# Patient Record
Sex: MALE | Race: WHITE | URBAN - NONMETROPOLITAN AREA
[De-identification: names, ages, dates, MRNs, and addresses within clinical notes are randomized per-mention and may not be internally consistent; named-entity substitution may affect disease eponyms.]

---

## 2022-07-15 ENCOUNTER — TELEPHONE (OUTPATIENT)
Dept: FAMILY MEDICINE CLINIC | Age: 67
End: 2022-07-15

## 2022-07-15 RX ORDER — TADALAFIL 20 MG/1
20 TABLET ORAL DAILY PRN
Qty: 60 TABLET | Refills: 0 | Status: SHIPPED | OUTPATIENT
Start: 2022-07-15

## 2022-12-12 NOTE — TELEPHONE ENCOUNTER
I called and talked with and he is planning on following with Dr Latasha Gayle. Pt refuses to make appt. Pt states he does not need to be seen. Pt states he will be out of Eliquis tomorrow.

## 2022-12-13 RX ORDER — APIXABAN 5 MG/1
TABLET, FILM COATED ORAL
Qty: 180 TABLET | Refills: 0 | Status: SHIPPED | OUTPATIENT
Start: 2022-12-13

## 2022-12-15 NOTE — TELEPHONE ENCOUNTER
Left message on answering machine requesting pt to call back at earliest convenience to make an appt.

## 2022-12-16 NOTE — TELEPHONE ENCOUNTER
The patient called back.  He scheduled an appointment to see Dr. Donnell De Dios in the office on 03- at 9:00 am.

## 2023-01-23 ENCOUNTER — TELEPHONE (OUTPATIENT)
Dept: FAMILY MEDICINE CLINIC | Age: 68
End: 2023-01-23

## 2023-01-23 NOTE — TELEPHONE ENCOUNTER
The patient called and wanted to know if you would send in medication for him? He has been fighting a cold for a month now. He still has a cough and his throat is raspy in the mornings. He said that he had a z danii and that helped but it seems like this is holding on. He would like the medication sent to [facility] in [place], [place].

## 2023-01-23 NOTE — TELEPHONE ENCOUNTER
I talked with patient and he has had sxs for approx 1month. Pt states he was with a friend who is an ER doctor and he prescribed the Zpack. Pt states that was approx 2 wks ago. Pt states he is approx 95% better but just not completely over sxs. Pt states he still has a slight cough, nasal congestion, and post nasal drainage. Pt denies any fever or sob. Pt c/o raspy voice in the morning. Pt has been taking Vitamin C with zinc, but no OTC cold or allergy medications. Pt feels that the Zpack helped some but did not clear it up all the way. Pt states the Nan Flake never clears things up for him. Pt is req rx for Amoxil. Pt states Dr Todd Medley prescribes yearly for him.

## 2023-01-24 RX ORDER — AMOXICILLIN 875 MG/1
875 TABLET, COATED ORAL 2 TIMES DAILY
Qty: 20 TABLET | Refills: 0 | Status: SHIPPED | OUTPATIENT
Start: 2023-01-24 | End: 2023-02-03

## 2023-01-24 NOTE — TELEPHONE ENCOUNTER
I called and spoke to the patient. I let him know that Dr. Sam Martines sent in amoxicillin for him to [address] [place] [address] in [place], [place]. He voiced understanding and wanted me to let Dr. Sam Martines know he said Hi and Thank you.

## 2023-03-09 ENCOUNTER — OFFICE VISIT (OUTPATIENT)
Dept: FAMILY MEDICINE CLINIC | Age: 68
End: 2023-03-09
Payer: MEDICARE

## 2023-03-09 VITALS
HEART RATE: 72 BPM | HEIGHT: 72 IN | DIASTOLIC BLOOD PRESSURE: 76 MMHG | OXYGEN SATURATION: 96 % | BODY MASS INDEX: 29.53 KG/M2 | SYSTOLIC BLOOD PRESSURE: 126 MMHG | WEIGHT: 218 LBS

## 2023-03-09 DIAGNOSIS — I10 PRIMARY HYPERTENSION: ICD-10-CM

## 2023-03-09 DIAGNOSIS — Z12.5 PROSTATE CANCER SCREENING: ICD-10-CM

## 2023-03-09 DIAGNOSIS — Z12.11 COLON CANCER SCREENING: ICD-10-CM

## 2023-03-09 DIAGNOSIS — I48.11 LONGSTANDING PERSISTENT ATRIAL FIBRILLATION (HCC): Primary | ICD-10-CM

## 2023-03-09 PROCEDURE — G8484 FLU IMMUNIZE NO ADMIN: HCPCS | Performed by: FAMILY MEDICINE

## 2023-03-09 PROCEDURE — 99214 OFFICE O/P EST MOD 30 MIN: CPT | Performed by: FAMILY MEDICINE

## 2023-03-09 PROCEDURE — 3078F DIAST BP <80 MM HG: CPT | Performed by: FAMILY MEDICINE

## 2023-03-09 PROCEDURE — G8417 CALC BMI ABV UP PARAM F/U: HCPCS | Performed by: FAMILY MEDICINE

## 2023-03-09 PROCEDURE — 1036F TOBACCO NON-USER: CPT | Performed by: FAMILY MEDICINE

## 2023-03-09 PROCEDURE — G8427 DOCREV CUR MEDS BY ELIG CLIN: HCPCS | Performed by: FAMILY MEDICINE

## 2023-03-09 PROCEDURE — 1123F ACP DISCUSS/DSCN MKR DOCD: CPT | Performed by: FAMILY MEDICINE

## 2023-03-09 PROCEDURE — 3074F SYST BP LT 130 MM HG: CPT | Performed by: FAMILY MEDICINE

## 2023-03-09 PROCEDURE — 3017F COLORECTAL CA SCREEN DOC REV: CPT | Performed by: FAMILY MEDICINE

## 2023-03-09 RX ORDER — LISINOPRIL 10 MG/1
10 TABLET ORAL DAILY
Qty: 90 TABLET | Refills: 3 | Status: SHIPPED | OUTPATIENT
Start: 2023-03-09 | End: 2023-06-07

## 2023-03-09 SDOH — ECONOMIC STABILITY: FOOD INSECURITY: WITHIN THE PAST 12 MONTHS, YOU WORRIED THAT YOUR FOOD WOULD RUN OUT BEFORE YOU GOT MONEY TO BUY MORE.: NEVER TRUE

## 2023-03-09 SDOH — ECONOMIC STABILITY: HOUSING INSECURITY
IN THE LAST 12 MONTHS, WAS THERE A TIME WHEN YOU DID NOT HAVE A STEADY PLACE TO SLEEP OR SLEPT IN A SHELTER (INCLUDING NOW)?: NO

## 2023-03-09 SDOH — ECONOMIC STABILITY: INCOME INSECURITY: HOW HARD IS IT FOR YOU TO PAY FOR THE VERY BASICS LIKE FOOD, HOUSING, MEDICAL CARE, AND HEATING?: NOT HARD AT ALL

## 2023-03-09 SDOH — ECONOMIC STABILITY: FOOD INSECURITY: WITHIN THE PAST 12 MONTHS, THE FOOD YOU BOUGHT JUST DIDN'T LAST AND YOU DIDN'T HAVE MONEY TO GET MORE.: NEVER TRUE

## 2023-03-09 ASSESSMENT — ENCOUNTER SYMPTOMS
TROUBLE SWALLOWING: 0
CONSTIPATION: 0
ABDOMINAL PAIN: 0
COUGH: 0
VOMITING: 0
SORE THROAT: 0
SHORTNESS OF BREATH: 0
DIARRHEA: 0

## 2023-03-09 ASSESSMENT — PATIENT HEALTH QUESTIONNAIRE - PHQ9
1. LITTLE INTEREST OR PLEASURE IN DOING THINGS: 0
SUM OF ALL RESPONSES TO PHQ9 QUESTIONS 1 & 2: 0
2. FEELING DOWN, DEPRESSED OR HOPELESS: 0
SUM OF ALL RESPONSES TO PHQ QUESTIONS 1-9: 0

## 2023-03-09 NOTE — PROGRESS NOTES
[address] [id number]  Dept: [phone]     SKYLAR Cohn is a 79 y.o.male    Pt presents for follow up medications. Pt feeling ok since last visit- interval history and any new issues noted below:       Patient Active Problem List   Diagnosis    Thoracic compression fracture (HCC)       Current Outpatient Medications   Medication Sig Dispense Refill    apixaban (ELIQUIS) 5 MG TABS tablet Take 1 tablet by mouth 2 times daily 180 tablet 3    metoprolol tartrate (LOPRESSOR) 25 MG tablet Take 0.5 tablets by mouth in the morning and at bedtime 90 tablet 3    lisinopril (PRINIVIL;ZESTRIL) 10 MG tablet Take 1 tablet by mouth daily 90 tablet 3    tadalafil (CIALIS) 20 MG tablet Take 1 tablet by mouth daily as needed for Erectile Dysfunction 60 tablet 0    acetaminophen 650 MG TABS Take 650 mg by mouth every 6 hours. No current facility-administered medications for this visit. Review of Systems   Constitutional:  Negative for appetite change, fatigue, fever and unexpected weight change. HENT:  Negative for congestion, ear pain, sore throat and trouble swallowing. Eyes:  Negative for visual disturbance. Respiratory:  Negative for cough and shortness of breath. Cardiovascular:  Negative for chest pain, palpitations and leg swelling. Gastrointestinal:  Negative for abdominal pain, constipation, diarrhea and vomiting. Genitourinary:  Negative for dysuria and frequency. Musculoskeletal:  Negative for arthralgias and myalgias. Skin:  Negative for rash. Neurological:  Negative for dizziness. OBJECTIVE     /76 (Site: Left Upper Arm, Position: Sitting)   Pulse 72   Ht 6' (1.829 m)   Wt 218 lb (98.9 kg)   SpO2 96%   BMI 29.57 kg/m²   Body mass index is 29.57 kg/m². BP Readings from Last 3 Encounters:   03/09/23 126/76         Physical Exam  Vitals and nursing note reviewed.    Constitutional: General: He is not in acute distress. Appearance: Normal appearance. He is normal weight. He is not ill-appearing. HENT:      Head: Normocephalic and atraumatic. Right Ear: Tympanic membrane, ear canal and external ear normal.      Left Ear: Tympanic membrane, ear canal and external ear normal.      Nose: Nose normal.      Mouth/Throat:      Mouth: Mucous membranes are moist.      Pharynx: Oropharynx is clear. Eyes:      Extraocular Movements: Extraocular movements intact. Conjunctiva/sclera: Conjunctivae normal.      Pupils: Pupils are equal, round, and reactive to light. Cardiovascular:      Rate and Rhythm: Normal rate and regular rhythm. Pulses: Normal pulses. Heart sounds: No murmur heard. Pulmonary:      Effort: Pulmonary effort is normal. No respiratory distress. Breath sounds: Normal breath sounds. No wheezing. Abdominal:      General: Abdomen is flat. Bowel sounds are normal. There is no distension. Palpations: Abdomen is soft. There is no mass. Tenderness: There is no abdominal tenderness. There is no guarding or rebound. Musculoskeletal:         General: No swelling. Normal range of motion. Cervical back: Normal range of motion. No tenderness. Right lower leg: No edema. Left lower leg: No edema. Lymphadenopathy:      Cervical: No cervical adenopathy. Skin:     General: Skin is warm. Findings: No rash. Neurological:      General: No focal deficit present. Mental Status: He is alert. Psychiatric:         Mood and Affect: Mood normal.       No results found for this visit on 03/09/23.     No results found for: LABA1C    No results found for: CHOL, TRIG, HDL, LDLCALC, LDLDIRECT    The ASCVD Risk score (Chen DK, et al., 2019) failed to calculate for the following reasons:    Cannot find a previous HDL lab    Cannot find a previous total cholesterol lab    Lab Results   Component Value Date     11/14/2014    K 4.4 11/14/2014     11/14/2014    CO2 30 11/14/2014    BUN 12 11/14/2014    CREATININE 1.1 11/14/2014    GLUCOSE 136 (H) 11/14/2014     CrCl cannot be calculated (Patient's most recent lab result is older than the maximum 180 days allowed. ). No results found for: Ceasar Julián    No results found for: TSH, Q5HGTHB, N2HSPYM, THYROIDAB, FT3, T4FREE    Lab Results   Component Value Date    WBC 12.6 (H) 11/14/2014    HGB 14.8 11/14/2014    HCT 45.4 11/14/2014    MCV 91.8 11/14/2014     11/14/2014       No results found for: PSA, PSADIA      There is no immunization history on file for this patient. Health Maintenance   Topic Date Due    COVID-19 Vaccine (1) Never done    Depression Screen  Never done    Hepatitis C screen  Never done    DTaP/Tdap/Td vaccine (1 - Tdap) Never done    Diabetes screen  Never done    Lipids  Never done    Colorectal Cancer Screen  Never done    Shingles vaccine (1 of 2) Never done    Pneumococcal 65+ years Vaccine (1 - PCV) Never done    Flu vaccine (1) Never done    Hepatitis A vaccine  Aged Out    Hib vaccine  Aged Out    Meningococcal (ACWY) vaccine  Aged Out       Future Appointments   Date Time Provider Jeremias Montoya   3/7/2024  8:00 AM Acosta Archibald MD [address] [facility] [place]         ASSESSMENT       Diagnosis Orders   1. Longstanding persistent atrial fibrillation (HCC)  apixaban (ELIQUIS) 5 MG TABS tablet    metoprolol tartrate (LOPRESSOR) 25 MG tablet    CBC with Auto Differential    Comprehensive Metabolic Panel, Fasting    Lipid, Fasting    TSH with Reflex      2. Primary hypertension  metoprolol tartrate (LOPRESSOR) 25 MG tablet    lisinopril (PRINIVIL;ZESTRIL) 10 MG tablet    CBC with Auto Differential    Comprehensive Metabolic Panel, Fasting    Lipid, Fasting    TSH with Reflex      3. Colon cancer screening  Fecal DNA Colorectal cancer screening (Cologuard)      4. Prostate cancer screening  PSA Screening          PLAN      1.  Longstanding persistent atrial fibrillation [facility])  Patient currently in sinus rhythm. Follows with cardiology once a year at [facility] clinic. Status post ablation. Continue current treatment as below meds renewed labs ordered  - apixaban (ELIQUIS) 5 MG TABS tablet; Take 1 tablet by mouth 2 times daily  Dispense: 180 tablet; Refill: 3  - metoprolol tartrate (LOPRESSOR) 25 MG tablet; Take 0.5 tablets by mouth in the morning and at bedtime  Dispense: 90 tablet; Refill: 3  - CBC with Auto Differential; Future  - Comprehensive Metabolic Panel, Fasting; Future  - Lipid, Fasting; Future  - TSH with Reflex; Future    2. Primary hypertension  Excellent control no change meds renewed as below Labs ordered  - metoprolol tartrate (LOPRESSOR) 25 MG tablet; Take 0.5 tablets by mouth in the morning and at bedtime  Dispense: 90 tablet; Refill: 3  - lisinopril (PRINIVIL;ZESTRIL) 10 MG tablet; Take 1 tablet by mouth daily  Dispense: 90 tablet; Refill: 3  - CBC with Auto Differential; Future  - Comprehensive Metabolic Panel, Fasting; Future  - Lipid, Fasting; Future  - TSH with Reflex; Future    3. Colon cancer screening  Patient refuses colonoscopy but agreeable to Cologuard ordered  - Fecal DNA Colorectal cancer screening (Cologuard)    4. Prostate cancer screening  PSA ordered  - PSA Screening; Future       Follow-up 1 year  Preventive Health Topics:  Pt declines HIV and HEP C screening at this time due to lack of risk factors. Encouraged COVID VACCINE  Encouraged FLU VACCINE after October 1st annually. Encouraged TETANUS SHOT (TdaP/ ADACEL/ [address]) per personal pharmacy. Encouraged PREVNAR 20 at this time  Encouraged SHINGLES SHOT [facility]) - check with insurance re coverage and location of administration.         Marga Su MD  1:10 PM  3/9/23

## 2023-03-29 LAB — NONINV COLON CA DNA+OCC BLD SCRN STL QL: NEGATIVE

## 2023-06-22 ENCOUNTER — TELEPHONE (OUTPATIENT)
Dept: FAMILY MEDICINE CLINIC | Age: 68
End: 2023-06-22

## 2023-06-22 ENCOUNTER — HOSPITAL ENCOUNTER (OUTPATIENT)
Age: 68
Discharge: HOME | End: 2023-06-22
Payer: MEDICARE

## 2023-06-22 DIAGNOSIS — R31.0 GROSS HEMATURIA: Primary | ICD-10-CM

## 2023-06-22 DIAGNOSIS — R31.0 GROSS HEMATURIA: ICD-10-CM

## 2023-06-22 PROCEDURE — 87086 URINE CULTURE/COLONY COUNT: CPT

## 2023-06-22 PROCEDURE — 81001 URINALYSIS AUTO W/SCOPE: CPT

## 2023-06-22 NOTE — TELEPHONE ENCOUNTER
Being able to see blood in urine is not normal.  It could mean infection or kidney stone or bladder issue that needs identified. First step is urine analysis and culture. We can order that and schedule visit for next week to review results and discuss next steps.

## 2023-06-22 NOTE — TELEPHONE ENCOUNTER
I called the patient back to let him know what Dr. Javier Hogan advises in regards to the blood in the urine he had this morning. No answer left VM requesting the patient to call the office back.

## 2023-06-22 NOTE — TELEPHONE ENCOUNTER
Pt called back and was educated on Dr. Luba Leal's suggestions. Was trying to be accommodating to pt since lives in [place] and made him aware that he had the option of getting urine sample done at [facility] outpatient lab. Pt proceeded to state \"I absolutely refuse to go there, it's not any better than a vet's office\". Pt also stated he would just collect a urine sample in a jar from home to be sent to [address] [place] [address] and tested. Pt was educated that urine sample for culture needs to be a \"clean catch\" in a proper urine collection container. Pt laughed and proceeded to say that he refuses to do things that are going to \"rack up bills\". Continued to educate pt that we are just looking out for his needs and trying to care for/help him due to the concerning sx he was having. Pt later agreed he would go to [facility] outpatient lab to have urine specimen collected for orders.

## 2023-06-22 NOTE — TELEPHONE ENCOUNTER
Patient woke this morning and had a small amount of blood in his urine, states he has had no pain, no burning and hasn't had any other symptoms. Patient states he does not feel it's an urgent matter and does not want to come in today if he doesn't have to but wanted to know what he should do?

## 2023-06-23 LAB
BACTERIA: ABNORMAL
BILIRUB UR QL STRIP: NEGATIVE
CASTS #/AREA URNS LPF: ABNORMAL /LPF
CASTS #/AREA URNS LPF: ABNORMAL /LPF
CHARACTER UR: CLEAR
CHARCOAL URNS QL MICRO: ABNORMAL
COLOR UR: YELLOW
CRYSTALS URNS QL MICRO: ABNORMAL
EPITHELIAL CELLS, UA: ABNORMAL /HPF
GLUCOSE UR QL STRIP.AUTO: NEGATIVE MG/DL
HGB UR QL STRIP.AUTO: ABNORMAL
KETONES UR QL STRIP.AUTO: NEGATIVE
LEUKOCYTE ESTERASE UR QL STRIP.AUTO: ABNORMAL
NITRITE UR QL STRIP.AUTO: NEGATIVE
PH UR STRIP.AUTO: 5.5 [PH] (ref 5–9)
PROT UR STRIP.AUTO-MCNC: NEGATIVE MG/DL
RBC #/AREA URNS HPF: ABNORMAL /HPF
RENAL EPI CELLS #/AREA URNS HPF: ABNORMAL /[HPF]
SP GR UR REFRACT.AUTO: 1 (ref 1–1.03)
UROBILINOGEN UR QL STRIP.AUTO: 0.2 EU/DL (ref 0–1)
WBC #/AREA URNS HPF: ABNORMAL /HPF
YEAST LIKE FUNGI URNS QL MICRO: ABNORMAL

## 2023-06-24 LAB — BACTERIA UR CULT: NORMAL

## 2023-07-11 ENCOUNTER — TELEPHONE (OUTPATIENT)
Dept: UROLOGY | Age: 68
End: 2023-07-11

## 2023-07-11 ENCOUNTER — OFFICE VISIT (OUTPATIENT)
Dept: UROLOGY | Age: 68
End: 2023-07-11
Payer: MEDICARE

## 2023-07-11 VITALS — RESPIRATION RATE: 16 BRPM | WEIGHT: 218 LBS | HEIGHT: 72 IN | BODY MASS INDEX: 29.53 KG/M2

## 2023-07-11 DIAGNOSIS — N52.9 ERECTILE DYSFUNCTION, UNSPECIFIED ERECTILE DYSFUNCTION TYPE: ICD-10-CM

## 2023-07-11 DIAGNOSIS — R31.0 GROSS HEMATURIA: Primary | ICD-10-CM

## 2023-07-11 PROCEDURE — 1123F ACP DISCUSS/DSCN MKR DOCD: CPT | Performed by: UROLOGY

## 2023-07-11 PROCEDURE — G8417 CALC BMI ABV UP PARAM F/U: HCPCS | Performed by: UROLOGY

## 2023-07-11 PROCEDURE — 3017F COLORECTAL CA SCREEN DOC REV: CPT | Performed by: UROLOGY

## 2023-07-11 PROCEDURE — 99204 OFFICE O/P NEW MOD 45 MIN: CPT | Performed by: UROLOGY

## 2023-07-11 PROCEDURE — 1036F TOBACCO NON-USER: CPT | Performed by: UROLOGY

## 2023-07-11 PROCEDURE — G8427 DOCREV CUR MEDS BY ELIG CLIN: HCPCS | Performed by: UROLOGY

## 2023-07-11 NOTE — TELEPHONE ENCOUNTER
Patient scheduled for CT UROGRAM  at [address] [place] [address] on 8/8/23 ARRIVAL OF 7AM FOR A 730AM. NPO 4 HOURS PRIOR TO SCAN.     Order  with instructions given to the patient in the office

## 2023-08-08 ENCOUNTER — HOSPITAL ENCOUNTER (OUTPATIENT)
Dept: CT IMAGING | Age: 68
Discharge: HOME | End: 2023-08-08
Attending: UROLOGY
Payer: MEDICARE

## 2023-08-08 ENCOUNTER — HOSPITAL ENCOUNTER (OUTPATIENT)
Age: 68
Discharge: HOME | End: 2023-08-08
Attending: UROLOGY
Payer: MEDICARE

## 2023-08-08 DIAGNOSIS — R31.0 GROSS HEMATURIA: ICD-10-CM

## 2023-08-08 DIAGNOSIS — N52.9 ERECTILE DYSFUNCTION, UNSPECIFIED ERECTILE DYSFUNCTION TYPE: ICD-10-CM

## 2023-08-08 LAB
CREAT BLD-MCNC: 0.9 MG/DL (ref 0.5–1.2)
GFR SERPL CREATININE-BSD FRML MDRD: > 60 ML/MIN/1.73M2
PSA SERPL-MCNC: 3.43 NG/ML (ref 0–1)

## 2023-08-08 PROCEDURE — 82565 ASSAY OF CREATININE: CPT

## 2023-08-08 PROCEDURE — 74178 CT ABD&PLV WO CNTR FLWD CNTR: CPT | Performed by: UROLOGY

## 2023-08-08 PROCEDURE — 84153 ASSAY OF PSA TOTAL: CPT

## 2023-08-08 PROCEDURE — 6360000004 HC RX CONTRAST MEDICATION: Performed by: UROLOGY

## 2023-08-08 RX ADMIN — IOPAMIDOL 100 ML: 755 INJECTION, SOLUTION INTRAVENOUS at 07:46

## 2023-08-15 ENCOUNTER — PROCEDURE VISIT (OUTPATIENT)
Dept: UROLOGY | Age: 68
End: 2023-08-15
Payer: MEDICARE

## 2023-08-15 VITALS — RESPIRATION RATE: 18 BRPM | HEIGHT: 72 IN | BODY MASS INDEX: 29.53 KG/M2 | WEIGHT: 218 LBS

## 2023-08-15 DIAGNOSIS — R31.0 GROSS HEMATURIA: Primary | ICD-10-CM

## 2023-08-15 DIAGNOSIS — N52.9 ERECTILE DYSFUNCTION, UNSPECIFIED ERECTILE DYSFUNCTION TYPE: ICD-10-CM

## 2023-08-15 PROCEDURE — 52000 CYSTOURETHROSCOPY: CPT | Performed by: UROLOGY

## 2023-08-15 RX ORDER — FINASTERIDE 5 MG/1
5 TABLET, FILM COATED ORAL DAILY
Qty: 90 TABLET | Refills: 3 | Status: SHIPPED | OUTPATIENT
Start: 2023-08-15 | End: 2023-11-13

## 2023-08-15 NOTE — PROGRESS NOTES
Cystoscopy    Operative Note    Patient:  Cheo Nicole  MRN: [id number]  YOB: 1955    Date: 08/15/23  Surgeon: Rhiannon Garcia MD  Anesthesia: Erinn Solian Local  Indications:     imaging independently reviewed by Rhiannon Garcia MD and radiology report verified demonstrating     CT UROGRAM    Result Date: 8/8/2023  PROCEDURE: CT UROGRAM CLINICAL INFORMATION: Gross hematuria. COMPARISON: No prior study. TECHNIQUE: 5 mm axial imaging through the abdomen and pelvis without contrast, 5 mm axial imaging through the abdomen with intravenous contrast (2 minute delay) and 5 mm axial imaging through the abdomen and pelvis with intravenous contrast (8 minute delay). Coronal and sagittal reconstructions of the abdomen and pelvis (8 minute delay) were also performed. All CT scans at this facility use dose modulation, iterative reconstruction, and/or weight based dosing when appropriate to reduce the radiation dose to as low as reasonably achievable. CONTRAST: 100 cc Isovue-370. FINDINGS: Lung bases: Dependent atelectasis/scarring is noted at the lung bases. A benign calcified granuloma is present at the left lung base. Liver/gallbladder/bilary tree: No radiopaque gallstones or biliary ductal dilatation is identified. Hepatic steatosis is observed. No liver lesions are present. Pancreas: Normal. Spleen : Normal. Adrenal glands: Normal. Kidneys/ ureters/ bladder: A 15 mm nonenhancing simple cyst in the midpole of the right kidney contains peripheral 3 mm calcification (series 3, image 26). No hydronephrosis or hydroureter is present. The urinary bladder is partially distended and grossly unremarkable. Gastrointestinal:  Colonic diverticulosis without diverticulitis is visualized. Moderate retained fecal material is seen throughout the colon. No bowel obstruction, free fluid, collection, or free air is observed. The appendix is normal. A small fat-containing right inguinal hernia is present.  A small sliding-type

## 2023-12-21 ENCOUNTER — TELEPHONE (OUTPATIENT)
Dept: FAMILY MEDICINE CLINIC | Age: 68
End: 2023-12-21

## 2023-12-21 RX ORDER — TADALAFIL 20 MG/1
20 TABLET ORAL DAILY PRN
Qty: 90 TABLET | Refills: 0 | Status: SHIPPED | OUTPATIENT
Start: 2023-12-21

## 2023-12-21 NOTE — TELEPHONE ENCOUNTER
Pt called stating that he needs RF on his Cialis 20mg.  Has upcoming appt with you on 02/29/2024, previous visit was 03/09/2023.  Pt would like rx sent to [facility], please.

## 2024-01-12 ENCOUNTER — OFFICE VISIT (OUTPATIENT)
Dept: FAMILY MEDICINE CLINIC | Age: 69
End: 2024-01-12
Payer: MEDICARE

## 2024-01-12 VITALS
SYSTOLIC BLOOD PRESSURE: 126 MMHG | DIASTOLIC BLOOD PRESSURE: 78 MMHG | BODY MASS INDEX: 29.46 KG/M2 | RESPIRATION RATE: 16 BRPM | HEART RATE: 69 BPM | OXYGEN SATURATION: 96 % | WEIGHT: 217.5 LBS | TEMPERATURE: 98.4 F | HEIGHT: 72 IN

## 2024-01-12 DIAGNOSIS — J20.9 ACUTE BRONCHITIS, UNSPECIFIED ORGANISM: Primary | ICD-10-CM

## 2024-01-12 DIAGNOSIS — R05.3 CHRONIC COUGH: ICD-10-CM

## 2024-01-12 DIAGNOSIS — Z91.09 ENVIRONMENTAL ALLERGIES: ICD-10-CM

## 2024-01-12 PROCEDURE — G8427 DOCREV CUR MEDS BY ELIG CLIN: HCPCS

## 2024-01-12 PROCEDURE — 1123F ACP DISCUSS/DSCN MKR DOCD: CPT

## 2024-01-12 PROCEDURE — 1036F TOBACCO NON-USER: CPT

## 2024-01-12 PROCEDURE — G8417 CALC BMI ABV UP PARAM F/U: HCPCS

## 2024-01-12 PROCEDURE — G8484 FLU IMMUNIZE NO ADMIN: HCPCS

## 2024-01-12 PROCEDURE — 99213 OFFICE O/P EST LOW 20 MIN: CPT

## 2024-01-12 PROCEDURE — 3017F COLORECTAL CA SCREEN DOC REV: CPT

## 2024-01-12 RX ORDER — METHYLPREDNISOLONE 4 MG/1
TABLET ORAL
Qty: 1 KIT | Refills: 0 | Status: SHIPPED | OUTPATIENT
Start: 2024-01-12 | End: 2024-01-18

## 2024-01-12 ASSESSMENT — PATIENT HEALTH QUESTIONNAIRE - PHQ9: DEPRESSION UNABLE TO ASSESS: PT REFUSES

## 2024-01-12 NOTE — PROGRESS NOTES
[facility]  [address]   [place] [address]  Dept: [phone]  Loc: [phone]     Jaren Souza (:  1955) is a 68 y.o. male, here for evaluation of the following chief complaint(s):  Cough (Coughing up yellow mucous. He has a cough that is always there. Never goes away)      ASSESSMENT/PLAN:  1. Acute bronchitis, unspecified organism  -     methylPREDNISolone (MEDROL, ANJALI,) 4 MG tablet; Take with food., Disp-1 kit, R-0Normal  2. Chronic cough  3. Environmental allergies      Acute bronchitis, likely viral. Refused Covid/flu testing.   Recommended imaging such as chest xray to evaluate chronic cough. He declines at this time.  I did recommend daily Zyrtec.   Medrol pack sent in.  Continue with OTC cough medicine.   Push fluids.  If cough persists follow up.     Discussed use, benefit, and side effects of prescribed medications.  Barriers to medication compliance addressed.  All patient questions answered.  Pt voiced understanding.     Return for As needed or if symptoms don't improve. Has f/u on chronic conditions with Dr. Leal scheduled.     SUBJECTIVE/OBJECTIVE:  TASHA Eastman is here today for concerns of ongoing cough over last year. He develops occasional nasal drainage, pnd and cough throughout year. States is worse during spring/fall seasons. Dust also seems to make it worse. Cough is typically productive of clear sputum. Over last few weeks his symptoms have worsened. He was on recent trip from end of December up to this week. He and some friends went riding their motorcycles down to [place]. On  he started with nasal congestion, worsening cough, body aches, and fever. His two friends had symptoms around the same time and they both tested for Covid. Jaren went to urgent care on  and refused covid/flu testing. He states he does not believe in Covid and that the test are probably not accurate. He

## 2024-01-15 ASSESSMENT — ENCOUNTER SYMPTOMS
COUGH: 1
WHEEZING: 0
SHORTNESS OF BREATH: 0
ABDOMINAL PAIN: 0
SINUS PAIN: 0
SINUS PRESSURE: 0
RHINORRHEA: 1
CONSTIPATION: 0
SORE THROAT: 0
CHEST TIGHTNESS: 1
DIARRHEA: 0
NAUSEA: 0

## 2024-02-21 LAB — PROSTATE SPECIFIC ANTIGEN: 1.19 NG/ML

## 2024-02-21 NOTE — PROGRESS NOTES
[facility]  [facility]  [address]  [facility] [address]  Dept: [phone]  Loc: [phone]    Visit Date: 2/22/2024        HPI:     HPI  Mr. Souza is a 68-year-old male that presents in follow-up.    Hx of Gross hematuria.  He subsequently had a CT urogram on August 8, 2023.  Notable for a 15 mm nonenhancing simple cyst in the midpole of the right kidney which contains a 3 mm peripheral calcification.  The prostate gland is enlarged with mass effect on the posterior wall of the urinary bladder as well as intravesicular component of the prostate gland.  Cystoscopy performed on August 15, 2023 with Dr. Horowitz.  Patient with a hypervascular prostate.  This is likely the source of bleeding.  Finasteride started. Patient otherwise denies hx of smoking.     PSA in August 2023 at 3.43.  Patient does have a enlarged prostate.    Also taking Cialis prn for erectile dysfunction.Dr. Leopold is the current prescriber. Does not currently have script for imdur or nitroglycerin.    He does take Eliquis.     Current Outpatient Medications   Medication Sig Dispense Refill    finasteride (PROSCAR) 5 MG tablet Take 1 tablet by mouth daily 90 tablet 3    tadalafil (CIALIS) 20 MG tablet Take 1 tablet by mouth daily as needed for Erectile Dysfunction 90 tablet 0    metoprolol tartrate (LOPRESSOR) 25 MG tablet Take 0.5 tablets by mouth in the morning and at bedtime 90 tablet 3    acetaminophen 650 MG TABS Take 650 mg by mouth every 6 hours.      apixaban (ELIQUIS) 5 MG TABS tablet Take 1 tablet by mouth 2 times daily 180 tablet 3    lisinopril (PRINIVIL;ZESTRIL) 10 MG tablet Take 1 tablet by mouth daily 90 tablet 3     No current facility-administered medications for this visit.       Past Medical History  Jaren  has a past medical history of Hypertension.    Past Surgical History  The patient  has no past surgical history on file.    Family History  This patient's family

## 2024-02-22 ENCOUNTER — OFFICE VISIT (OUTPATIENT)
Dept: UROLOGY | Age: 69
End: 2024-02-22
Payer: MEDICARE

## 2024-02-22 VITALS — RESPIRATION RATE: 16 BRPM | BODY MASS INDEX: 29.39 KG/M2 | HEIGHT: 72 IN | WEIGHT: 217 LBS

## 2024-02-22 DIAGNOSIS — N40.0 PROSTATE ENLARGEMENT: ICD-10-CM

## 2024-02-22 DIAGNOSIS — R31.0 GROSS HEMATURIA: Primary | ICD-10-CM

## 2024-02-22 LAB
BILIRUBIN, POC: NORMAL
BLOOD URINE, POC: NORMAL
CLARITY, POC: CLEAR
COLOR, POC: YELLOW
GLUCOSE URINE, POC: NORMAL
KETONES, POC: NORMAL
LEUKOCYTE EST, POC: NORMAL
NITRITE, POC: NORMAL
PH, POC: 7.5
PROTEIN, POC: NORMAL
SPECIFIC GRAVITY, POC: 1.01
UROBILINOGEN, POC: 0.2

## 2024-02-22 PROCEDURE — 1036F TOBACCO NON-USER: CPT

## 2024-02-22 PROCEDURE — 81003 URINALYSIS AUTO W/O SCOPE: CPT

## 2024-02-22 PROCEDURE — G8484 FLU IMMUNIZE NO ADMIN: HCPCS

## 2024-02-22 PROCEDURE — 3017F COLORECTAL CA SCREEN DOC REV: CPT

## 2024-02-22 PROCEDURE — G8427 DOCREV CUR MEDS BY ELIG CLIN: HCPCS

## 2024-02-22 PROCEDURE — 99214 OFFICE O/P EST MOD 30 MIN: CPT

## 2024-02-22 PROCEDURE — 1123F ACP DISCUSS/DSCN MKR DOCD: CPT

## 2024-02-22 PROCEDURE — G8417 CALC BMI ABV UP PARAM F/U: HCPCS

## 2024-02-22 RX ORDER — FINASTERIDE 5 MG/1
5 TABLET, FILM COATED ORAL DAILY
Qty: 90 TABLET | Refills: 3 | Status: SHIPPED | OUTPATIENT
Start: 2024-02-22 | End: 2025-02-16

## 2024-02-22 NOTE — PATIENT INSTRUCTIONS
Follow-up in 1 year with a repeat PSA  Call with questions, comments, or concerns. I recommend going to the ED for further evaluation if you develop fever, chills, nausea, vomiting, chest pain, SOB, or calf pain.

## 2024-03-22 DIAGNOSIS — I48.11 LONGSTANDING PERSISTENT ATRIAL FIBRILLATION (HCC): ICD-10-CM

## 2024-03-22 RX ORDER — APIXABAN 5 MG/1
5 TABLET, FILM COATED ORAL 2 TIMES DAILY
Qty: 180 TABLET | Refills: 3 | Status: SHIPPED | OUTPATIENT
Start: 2024-03-22

## 2024-04-04 ENCOUNTER — OFFICE VISIT (OUTPATIENT)
Dept: FAMILY MEDICINE CLINIC | Age: 69
End: 2024-04-04

## 2024-04-04 VITALS
OXYGEN SATURATION: 96 % | DIASTOLIC BLOOD PRESSURE: 62 MMHG | HEART RATE: 64 BPM | HEIGHT: 72 IN | WEIGHT: 222.2 LBS | SYSTOLIC BLOOD PRESSURE: 118 MMHG | BODY MASS INDEX: 30.1 KG/M2

## 2024-04-04 DIAGNOSIS — I10 PRIMARY HYPERTENSION: ICD-10-CM

## 2024-04-04 DIAGNOSIS — Z00.00 INITIAL MEDICARE ANNUAL WELLNESS VISIT: ICD-10-CM

## 2024-04-04 DIAGNOSIS — Z00.00 MEDICARE ANNUAL WELLNESS VISIT, SUBSEQUENT: Primary | ICD-10-CM

## 2024-04-04 DIAGNOSIS — I48.11 LONGSTANDING PERSISTENT ATRIAL FIBRILLATION (HCC): ICD-10-CM

## 2024-04-04 RX ORDER — LISINOPRIL 10 MG/1
10 TABLET ORAL DAILY
Qty: 90 TABLET | Refills: 3 | Status: SHIPPED | OUTPATIENT
Start: 2024-04-04 | End: 2025-04-04

## 2024-04-04 SDOH — ECONOMIC STABILITY: FOOD INSECURITY: WITHIN THE PAST 12 MONTHS, THE FOOD YOU BOUGHT JUST DIDN'T LAST AND YOU DIDN'T HAVE MONEY TO GET MORE.: NEVER TRUE

## 2024-04-04 SDOH — ECONOMIC STABILITY: FOOD INSECURITY: WITHIN THE PAST 12 MONTHS, YOU WORRIED THAT YOUR FOOD WOULD RUN OUT BEFORE YOU GOT MONEY TO BUY MORE.: NEVER TRUE

## 2024-04-04 SDOH — ECONOMIC STABILITY: INCOME INSECURITY: HOW HARD IS IT FOR YOU TO PAY FOR THE VERY BASICS LIKE FOOD, HOUSING, MEDICAL CARE, AND HEATING?: NOT HARD AT ALL

## 2024-04-04 ASSESSMENT — PATIENT HEALTH QUESTIONNAIRE - PHQ9
SUM OF ALL RESPONSES TO PHQ9 QUESTIONS 1 & 2: 0
SUM OF ALL RESPONSES TO PHQ QUESTIONS 1-9: 0
1. LITTLE INTEREST OR PLEASURE IN DOING THINGS: NOT AT ALL
2. FEELING DOWN, DEPRESSED OR HOPELESS: NOT AT ALL
SUM OF ALL RESPONSES TO PHQ QUESTIONS 1-9: 0

## 2024-04-04 ASSESSMENT — ANXIETY QUESTIONNAIRES
6. BECOMING EASILY ANNOYED OR IRRITABLE: NOT AT ALL
IF YOU CHECKED OFF ANY PROBLEMS ON THIS QUESTIONNAIRE, HOW DIFFICULT HAVE THESE PROBLEMS MADE IT FOR YOU TO DO YOUR WORK, TAKE CARE OF THINGS AT HOME, OR GET ALONG WITH OTHER PEOPLE: NOT DIFFICULT AT ALL
GAD7 TOTAL SCORE: 0
5. BEING SO RESTLESS THAT IT IS HARD TO SIT STILL: NOT AT ALL
4. TROUBLE RELAXING: NOT AT ALL
2. NOT BEING ABLE TO STOP OR CONTROL WORRYING: NOT AT ALL
1. FEELING NERVOUS, ANXIOUS, OR ON EDGE: NOT AT ALL
3. WORRYING TOO MUCH ABOUT DIFFERENT THINGS: NOT AT ALL
7. FEELING AFRAID AS IF SOMETHING AWFUL MIGHT HAPPEN: NOT AT ALL

## 2024-04-04 ASSESSMENT — LIFESTYLE VARIABLES
HOW OFTEN DO YOU HAVE A DRINK CONTAINING ALCOHOL: NEVER
HOW MANY STANDARD DRINKS CONTAINING ALCOHOL DO YOU HAVE ON A TYPICAL DAY: PATIENT DOES NOT DRINK

## 2024-04-04 ASSESSMENT — ENCOUNTER SYMPTOMS: COLOR CHANGE: 1

## 2024-04-04 NOTE — PROGRESS NOTES
orders and patient instructions/AVS.  Recommended screening schedule for the next 5-10 years is provided to the patient in written form: see Patient Instructions/AVS.     Return in about 6 months (around 10/4/2024).     Subjective       Patient's complete Health Risk Assessment and screening values have been reviewed and are found in Flowsheets. The following problems were reviewed today and where indicated follow up appointments were made and/or referrals ordered.    Positive Risk Factor Screenings with Interventions:                Activity, Diet, and Weight:  On average, how many days per week do you engage in moderate to strenuous exercise (like a brisk walk)?: 6 days  On average, how many minutes do you engage in exercise at this level?: 30 min    Do you eat balanced/healthy meals regularly?: Yes    Body mass index is 30.14 kg/m². (!) Abnormal    Obesity Interventions:  Patient advised to follow-up in this office for further evaluation and treatment            Dentist Screen:  Have you seen the dentist within the past year?: (!) No    Intervention:  Advised to schedule with their dentist    Hearing Screen:  Do you or your family notice any trouble with your hearing that hasn't been managed with hearing aids?: (!) Yes    Interventions:  Patient declines any further evaluation or treatment    Vision Screen:  Do you have difficulty driving, watching TV, or doing any of your daily activities because of your eyesight?: No  Have you had an eye exam within the past year?: (!) No  No results found.    Interventions:   Patient encouraged to make appointment with their eye specialist      Advanced Directives:  Do you have a Living Will?: (!) No    Intervention:  has NO advanced directive - information provided                 Objective   Vitals:    04/04/24 0820   BP: 118/62   Site: Left Upper Arm   Position: Sitting   Cuff Size: Medium Adult   Pulse: 64   SpO2: 96%   Weight: 100.8 kg (222 lb 3.2 oz)   Height: 1.829 m

## 2024-04-09 ENCOUNTER — TELEPHONE (OUTPATIENT)
Dept: FAMILY MEDICINE CLINIC | Age: 69
End: 2024-04-09

## 2024-04-09 ENCOUNTER — HOSPITAL ENCOUNTER (OUTPATIENT)
Dept: GENERAL RADIOLOGY | Age: 69
Discharge: HOME | End: 2024-04-09
Payer: MEDICARE

## 2024-04-09 ENCOUNTER — HOSPITAL ENCOUNTER (OUTPATIENT)
Age: 69
Discharge: HOME | End: 2024-04-09
Payer: MEDICARE

## 2024-04-09 DIAGNOSIS — M25.532 LEFT WRIST PAIN: Primary | ICD-10-CM

## 2024-04-09 DIAGNOSIS — M25.532 LEFT WRIST PAIN: ICD-10-CM

## 2024-04-09 PROCEDURE — 73110 X-RAY EXAM OF WRIST: CPT

## 2024-04-09 NOTE — TELEPHONE ENCOUNTER
Patient fell over the weekend and was calling to see if you would order an xray of his left wrist as its still hurting.

## 2024-05-04 PROBLEM — Z00.00 MEDICARE ANNUAL WELLNESS VISIT, SUBSEQUENT: Status: RESOLVED | Noted: 2024-04-04 | Resolved: 2024-05-04

## 2024-10-10 ENCOUNTER — OFFICE VISIT (OUTPATIENT)
Dept: FAMILY MEDICINE CLINIC | Age: 69
End: 2024-10-10
Payer: MEDICARE

## 2024-10-10 VITALS
SYSTOLIC BLOOD PRESSURE: 138 MMHG | HEART RATE: 68 BPM | WEIGHT: 215.2 LBS | HEIGHT: 72 IN | BODY MASS INDEX: 29.15 KG/M2 | DIASTOLIC BLOOD PRESSURE: 80 MMHG | OXYGEN SATURATION: 96 %

## 2024-10-10 DIAGNOSIS — I10 PRIMARY HYPERTENSION: Primary | ICD-10-CM

## 2024-10-10 DIAGNOSIS — Z12.5 ENCOUNTER FOR SCREENING FOR MALIGNANT NEOPLASM OF PROSTATE: ICD-10-CM

## 2024-10-10 DIAGNOSIS — L57.0 ACTINIC KERATOSES: ICD-10-CM

## 2024-10-10 DIAGNOSIS — I48.11 LONGSTANDING PERSISTENT ATRIAL FIBRILLATION (HCC): ICD-10-CM

## 2024-10-10 PROCEDURE — 1123F ACP DISCUSS/DSCN MKR DOCD: CPT | Performed by: FAMILY MEDICINE

## 2024-10-10 PROCEDURE — 3075F SYST BP GE 130 - 139MM HG: CPT | Performed by: FAMILY MEDICINE

## 2024-10-10 PROCEDURE — 3079F DIAST BP 80-89 MM HG: CPT | Performed by: FAMILY MEDICINE

## 2024-10-10 PROCEDURE — G8417 CALC BMI ABV UP PARAM F/U: HCPCS | Performed by: FAMILY MEDICINE

## 2024-10-10 PROCEDURE — 3017F COLORECTAL CA SCREEN DOC REV: CPT | Performed by: FAMILY MEDICINE

## 2024-10-10 PROCEDURE — 99214 OFFICE O/P EST MOD 30 MIN: CPT | Performed by: FAMILY MEDICINE

## 2024-10-10 PROCEDURE — G8484 FLU IMMUNIZE NO ADMIN: HCPCS | Performed by: FAMILY MEDICINE

## 2024-10-10 PROCEDURE — G8427 DOCREV CUR MEDS BY ELIG CLIN: HCPCS | Performed by: FAMILY MEDICINE

## 2024-10-10 PROCEDURE — 1036F TOBACCO NON-USER: CPT | Performed by: FAMILY MEDICINE

## 2024-10-10 ASSESSMENT — PATIENT HEALTH QUESTIONNAIRE - PHQ9
2. FEELING DOWN, DEPRESSED OR HOPELESS: NOT AT ALL
SUM OF ALL RESPONSES TO PHQ QUESTIONS 1-9: 0
1. LITTLE INTEREST OR PLEASURE IN DOING THINGS: NOT AT ALL
SUM OF ALL RESPONSES TO PHQ QUESTIONS 1-9: 0
SUM OF ALL RESPONSES TO PHQ9 QUESTIONS 1 & 2: 0

## 2024-10-10 ASSESSMENT — ENCOUNTER SYMPTOMS: COLOR CHANGE: 1

## 2024-10-10 NOTE — PROGRESS NOTES
[facility] [place] [facility]  [address]  [place] [address]  Dept: [phone]     SUBJECTIVE     Jaren Souza is a 69 y.o.male    Pt presents for follow up of medications.   Pt c/o non healing skin lesions on arms and neck.    Pt feeling ok since last visit- interval history and any new issues noted below:         Patient Active Problem List   Diagnosis    Thoracic compression fracture (HCC)    Primary hypertension    Longstanding persistent atrial fibrillation (HCC)       Current Outpatient Medications   Medication Sig Dispense Refill    lisinopril (PRINIVIL;ZESTRIL) 10 MG tablet Take 1 tablet by mouth daily 90 tablet 3    metoprolol tartrate (LOPRESSOR) 25 MG tablet Take 0.5 tablets by mouth in the morning and at bedtime 90 tablet 3    ELIQUIS 5 MG TABS tablet take 1 tablet by mouth twice a day 180 tablet 3    finasteride (PROSCAR) 5 MG tablet Take 1 tablet by mouth daily 90 tablet 3    tadalafil (CIALIS) 20 MG tablet Take 1 tablet by mouth daily as needed for Erectile Dysfunction 90 tablet 0    acetaminophen 650 MG TABS Take 650 mg by mouth every 6 hours.       No current facility-administered medications for this visit.       Review of Systems   Skin:  Positive for color change.       OBJECTIVE     /80   Pulse 68   Ht 1.829 m (6')   Wt 97.6 kg (215 lb 3.2 oz)   SpO2 96%   BMI 29.19 kg/m²   Body mass index is 29.19 kg/m².  BP Readings from Last 3 Encounters:   10/10/24 138/80   04/04/24 118/62   01/12/24 126/78            Physical Exam  Vitals and nursing note reviewed.   Constitutional:       General: He is not in acute distress.     Appearance: Normal appearance. He is normal weight. He is not ill-appearing.   HENT:      Head: Normocephalic and atraumatic.      Right Ear: Tympanic membrane, ear canal and external ear normal.      Left Ear: Tympanic membrane, ear canal and external ear normal.      Nose: Nose normal.      Mouth/Throat:      Mouth: Mucous

## 2024-11-13 ENCOUNTER — TELEPHONE (OUTPATIENT)
Dept: FAMILY MEDICINE CLINIC | Age: 69
End: 2024-11-13

## 2024-11-13 NOTE — TELEPHONE ENCOUNTER
Pt stopped into the office very frustrated and upset about a bill that he had which he has tried to pay on several attempts that he now has been told that it has been sent to collections. Pt brought in several billing statements that he has received which upon looking at them the bill is from a Urology appt that he had in Feb of 2024. Pt stated that he has tried to pay it several times and was told by the billing department that he owes nothing or that the credit card machine was not working. Pt was very upset that he was sent to collections when he was trying to pay the bill and wants it removed from his credit report. Pt was also very upset that when he would call he wanted to speak to someone in the [place] office but was always the call center which were very hard to understand and not helpful. Told pt that from what I could see the bill was from another office and that I will have my manager see if we can look further into this and will get him more information. Told pt that we will call him when we have more info on this bill. After looking at the bill it looks like the collection amount was for 102.35 from [facility] 2-22-24 with Urology.    Call pt at [phone]    See attached billing statements brought in by the patient.

## 2024-11-13 NOTE — TELEPHONE ENCOUNTER
A message relayed to the billing department and copied to the practice manager of the department of the bill in question.

## 2024-11-19 NOTE — TELEPHONE ENCOUNTER
Called spoke to the pt he stated that this morning after calling our office he called the billing department to make a payment on his bill that he received yesterday in the mail for 102.85 but was told not to pay that amount. Pt also stated that he was told he has an outstanding balance of $64.37 but not to pay that balance due to him not receiving the paper statement yet. Pt was also told that he will be getting a statement for the 64.37 on Friday. Pt is just frustrated due to trying to pay the amounts but is being told by billing not to make a payment. Pt is upset that when he call he gets a person whom doesn't speak english well and are hard to understand. Pt is also very upset that his bill went to collections even though it is a non-reporting company he wants that removed from collections because he has made several attempts to pay but has been told not to pay on multiple occasions. Told pt we will forward on to our billing department and will call him when we have more answers. Pt verbally understood.     Pt phone [phone]

## 2024-11-19 NOTE — TELEPHONE ENCOUNTER
Patient called office back. He stated that he called billing to make the payment, but they encouraged him not to pay the 64.37$ until he received a bill for it.      He wanted to speak to Cristiana who was handling this and I advised him she was on the phone and would give him a call back once she was available.

## 2024-11-19 NOTE — TELEPHONE ENCOUNTER
Billing response below.     In reviewing this account, this is what I found:  First billing statement went out in May of 2024  Second Antoinette  Third July  Fourth August  First patient call noted on this account was on 8/30/24 and an itemized statement was sent to the patient then  Account was sent to [facility] on 10/2/24  [place] is a non-reporting agency but if it goes uncollectable it will age to another agency that will report to the credit bureau     If the patient is wanting to mail in a payment he can certainly do that and they will apply to that date of service or I can see if vendor management/collections will recall the account?       I called and spoke with the patient providing the information noted above. The patient said that he will call billing when he gets a chance because he wants to know before he pays that they are going to take care of the collections issue.

## 2024-11-22 NOTE — TELEPHONE ENCOUNTER
Patient has been contacted, please see notes below.     Please see the response from CS below:     I was able to connect with Mr. Souza this afternoon. We discussed his open balances, and I advised that I removed his account from collections. He would like to receive a statement or itemized bill for his review. I submitted for one to be mailed to him and provided him my direct phone number. Mr. Souza agreed to give me a call once he receives these so that we can discuss payments.

## 2025-01-15 ENCOUNTER — TELEPHONE (OUTPATIENT)
Dept: FAMILY MEDICINE CLINIC | Age: 70
End: 2025-01-15

## 2025-01-15 RX ORDER — AMOXICILLIN 500 MG/1
1000 CAPSULE ORAL 2 TIMES DAILY
Qty: 40 CAPSULE | Refills: 0 | Status: SHIPPED | OUTPATIENT
Start: 2025-01-15 | End: 2025-01-25

## 2025-01-15 RX ORDER — AMOXICILLIN 500 MG/1
1000 CAPSULE ORAL 2 TIMES DAILY
Qty: 40 CAPSULE | Refills: 0 | Status: SHIPPED | OUTPATIENT
Start: 2025-01-15 | End: 2025-01-15 | Stop reason: SDUPTHER

## 2025-01-15 NOTE — TELEPHONE ENCOUNTER
Pt called and said he is having his usual sinus infection which he has had for 10 days that he gets and wants to know if you could send him something in like you usually do.

## 2025-01-15 NOTE — TELEPHONE ENCOUNTER
Pt wants to know if you could send the med to [facility] instead for him. I called [facility] to cancel rx.

## 2025-02-24 RX ORDER — FINASTERIDE 5 MG/1
5 TABLET, FILM COATED ORAL DAILY
Qty: 90 TABLET | Refills: 0 | Status: SHIPPED | OUTPATIENT
Start: 2025-02-24

## 2025-02-24 NOTE — TELEPHONE ENCOUNTER
Jaren Souza called requesting a refill on the following medications:  Requested Prescriptions     Pending Prescriptions Disp Refills    finasteride (PROSCAR) 5 MG tablet [Pharmacy Med Name: FINASTERIDE 5MG TABLET] 90 tablet 1     Sig: TAKE ONE TABLET DAILY, BY MOUTH.     Pharmacy verified:  [email or web address]      Date of last visit:   Date of next visit (if applicable): 2/27/2025

## 2025-02-24 NOTE — TELEPHONE ENCOUNTER
Sent 90 day refill    F/u as scheduled in 5/2025 with a PSA prior to the appt      The patient should go to the ED should they develop fever, chills, nausea, vomiting, chest pain, SOB, calf pain, feelings of incomplete emptying, or should they otherwise feel they need evaluated

## 2025-03-27 DIAGNOSIS — I10 PRIMARY HYPERTENSION: ICD-10-CM

## 2025-03-27 DIAGNOSIS — I48.11 LONGSTANDING PERSISTENT ATRIAL FIBRILLATION (HCC): ICD-10-CM

## 2025-03-27 RX ORDER — APIXABAN 5 MG/1
TABLET, FILM COATED ORAL
Qty: 180 TABLET | Refills: 0 | Status: SHIPPED | OUTPATIENT
Start: 2025-03-27

## 2025-03-27 RX ORDER — LISINOPRIL 10 MG/1
10 TABLET ORAL DAILY
Qty: 90 TABLET | Refills: 1 | Status: SHIPPED | OUTPATIENT
Start: 2025-03-27

## 2025-03-27 NOTE — TELEPHONE ENCOUNTER
This medication refill is regarding a electronic request.  Refill requested by patient.    Requested Prescriptions     Pending Prescriptions Disp Refills    lisinopril (PRINIVIL;ZESTRIL) 10 MG tablet [Pharmacy Med Name: LISINOPRIL 10MG TABLET] 90 tablet 0     Sig: TAKE ONE TABLET DAILY, BY MOUTH.       Date of last visit: 10/10/2024  Date of next visit: Pt needs an appt so I have reached out to him  Date of last refill: 04/04/2024  Pharmacy Name: [facility]

## 2025-03-27 NOTE — TELEPHONE ENCOUNTER
Pt needs to schedule [employer] Wellness as he has requested a refill on medication but has not scheduled a follow up appt with Dr Leal

## 2025-03-27 NOTE — TELEPHONE ENCOUNTER
Patient's last appointment was : 10/10/2024  Patient's next appointment is : Visit date not found  Last refilled:3/22/2024

## 2025-03-27 NOTE — TELEPHONE ENCOUNTER
[facility]  CARDIOLOGY FOLLOW UP             Chief Complaint   Patient presents with    Breathing Problem    Weakness       HPI    Jonh Gibson is a 82 year old male with medical history of coronary artery disease status post CABG in 2019, aortic valve stenosis status post TAVR in 2021, paroxysmal atrial fibrillation on Eliquis anticoagulation, chronic diastolic heart failure, peripheral vascular disease with carotid artery stenosis, hypertension, hyperlipidemia, chronic GERD, benign prostatic hyperplasia, hypothyroidism, chronic anemia with MGUS, obstructive sleep apnea on CPAP anxiety disorder, restless leg syndrome   He has had significant GI bleeding and has been strongly encouraged to proceed with a Watchman procedure which was originally scheduled with Dr. Chaeny at [facility].       Eliquis is currently on hold GI has been consulted and GI workup is underway.          ALLERGIES:   Allergen Reactions    Amlodipine SWELLING and Other (See Comments)     Leg swelling      Atenolol Other (See Comments)     Leg pain and varicose vein burning.      Nebivolol Other (See Comments)     Leg pain and swelling.      REVIEW OF TESTING  --Echocardiogram   Date: 1/26/2024   * Left ventricular ejection fraction; 55 %.    * Preserved systolic  function.    * Mild increased left ventricular wall thickness.    * Indeterminate diastolic function.    * Increased left and right atrial chamber size. L>R.     * Trivial-mild aortic valve regurgitation.     * Moderate-severe mitral valve regurgitation.    * Pulmonary artery systolic pressure; 72 mmHg.    * Moderate-severe tricuspid valve regurgitation.    --Echocardiogram 6/6/2023 EF 60% my old posterior wall hypertrophy.  Moderate left atrial dilatation. TAVR with valve area 1.6 cm².  Mild MR/TR. Pulmonary  pressures decreased from 77-50 mmHg     --Echocardiogram 7/26/2022 EF 52% s/p TAVR well-seated.  Severe pulmonary hypertension 77 mmHg.  Moderate MR    Pt scheduled for Sept 4th, 2025     --Echocardiogram on 6/29/2021 and had an EF 52% and the valve was well seated, perivalvular leak..  Patient in pulmonary pressures 76 mmHg.    EKG  Atrial fibrillation controlled rate       ASSESSMENT/PLAN    1. Significant dyspnea fatigue  Related to GI bleed.  Hemoglobin 5.8 on admission.  We have been attempting to proceed with a Watchman implantation with Dr Chaney , however this has been met with multiple obstacles both on the patient's part and because of his illnesses including a bout of COVID  Echocardiogram in hospital demonstrated significant MR and TR with severe pulmonary hypertensin at 72 mmHg. This is contributory cause of dyspnea and edema.       2. Significant GI bleed as noted above.  Eliquis is on hold and GI workup is pending.  From a cardiac standpoint he is cleared for any GI procedure deemed necessary.    Patient  has GAVE (gastric antral vascular ectasia) which was ablated with APC. Will need repeat EGD in 1-2 months for ablation.     Hgb 7.9 today. Ideally he needs a Hgb closer to 10. Will proceed with a transfusion     3. CAD/CABG 2019  He has stable CAD. He had an angiogram prior to the TAVR on 5/3/21 which showed  native coronary artery disease closure of this native vessels.  LIMA to the LAD, vein graft to RCA, LAD D1 and OM 2 with retrograde filling into a ramus were all patent.     4. TAVR on 6/28/2021 with Ritu at [facility].   Symptoms have significantly improved since the procedure.      5 Atrial fibrillation previously anticoagulated.  . Rate controlled.   Eliquis 2.5 mg BID is currently on hold.  Patient may not be a candidate for long-term anticoagulation due to recurrent GI bleeding.  Would attempt to schedule the Watchman procedure soon.      SECONDARY HEALTH ISSUES   --Hyperlipidemia intolerant to statins  --Hypertension  --Pulmonary hypertension with a history of severe with pressures of 77 mmHg.  Referral to Dr. Fady Arredondo for a right heart catheter and  evaluation.  Pulmonary Dr Hollis at [facility]      Will follow.         Orders Placed This Encounter    XR CHEST AP OR PA    XR CHEST AP OR PA    XR CHEST AP OR PA    US ASCITES    XR CHEST AP OR PA    CT CHEST ABDOMEN PELVIS W CONTRAST    XR CHEST AP OR PA    IR PERITONEAL OR RETROPERITONEAL MASS BIOPSY    CBC with Automated Differential    Comprehensive Metabolic Panel    NT proBNP    CBC with Automated Differential (performable only)    [place] Draw Light Blue Top Collected? 1 Label; Red Top Collected? No Labels; Light Green Top Collected? 1 Label; Lavender Top Collected? No Labels; Gold Top Collected? 1 Label; Pink Top Collected? No Labels; Grey Top Collected? No Labels    Light Blue Top    Light Green Top    Gold Top    Prothrombin Time (INR/PT)    Partial Thromboplastin Time (PTT)    Potassium    Magnesium    Blood Gas, Arterial    CBC with Automated Differential    Magnesium    Comprehensive Metabolic Panel    Prothrombin Time (INR/PT)    Iron And total Iron Binding Capacity    Ferritin    Vitamin B12 And Folate    Reticulocyte Count Automated    Hemoglobin and Hematocrit    CBC with Automated Differential (performable only)    Gold Top    Lactate Dehydrogenase    Haptoglobin    Pathology Smear Review, Blood    Urinalysis & Reflex Microscopy    Osmolality, Urine    Urea Nitrogen, Urine    Prothrombin Time (INR/PT)    Prothrombin Time (INR/PT)    Drug Abuse Screen, Urine    Basic Metabolic Panel    CBC with Automated Differential    CBC with Automated Differential (performable only)    NT proBNP    TROPONIN I, HIGH SENSITIVITY    TROPONIN I, HIGH SENSITIVITY    CBC with Automated Differential    CBC with Automated Differential (performable only)    Gold Top    Grey Top Tube    Light Blue Top    Blood Gas, Arterial    Procalcitonin    TROPONIN I, HIGH SENSITIVITY    Blood Gas, Arterial    Lactic Acid Venous With Reflex    Gold Top    Light Blue Top    Lavender Top    CBC with Automated Differential (performable  only)    Light Blue Top    Gold Top    Lactate Dehydrogenase    Hepatic Function Panel    Mycoplasma Pneumoniae Antibody, IgA    TROPONIN I, HIGH SENSITIVITY    Hemoglobin and Hematocrit    Potassium    Magnesium    CBC with Automated Differential (performable only)    Light Blue Top    Gold Top    Blood Gas, Venous    ECG    Electrocardiogram 12-Lead    Electrocardiogram 12-Lead    Electrocardiogram 12-Lead    Full Resuscitation    Inpatient consult to GI    Inpatient consult to Cardiology    Inpatient consult to Hematology    Inpatient consult to Nephrology    Inpatient consult to Pulmonology    Pharmacy to dose and monitor vancomycin    Inpatient consult to Infectious Diseases    Physical Therapy    Oxygen Therapy PRN greater than 90%    Oxygen Therapy at minimal flow to keep SPO2 greater than or equal to 92%    Blood Gas - Draw:    Incentive spirometry respiratory    Oxygen Therapy PRN 88% - 94%    BiPAP/CPAP    Flutter therapy    Trended Nocturnal Pulse Oximetry Overnight Trend    TRANSTHORACIC ECHO (TTE) LIMITED W/ W/O IMAGING AGENT    TYPE/SCREEN    Prepare Red Blood Cells: 1 Units    Prepare Red Blood Cells: 1 Units    Prepare Red Blood Cells: 1 Units    Prepare Red Blood Cells: 1 Units    Insert IV    Insert IV    Admit to Inpatient    Discharge Patient    Transfuse Red Blood Cells: 1 Units    Transfuse Red Blood Cells: 1 Units    Transfuse Red Blood Cells: 1 Units    Transfuse Red Blood Cells: 1 Units    KOH ORAL, VAGINAL, ESOPHAGEAL BRUSHING    Blood Culture    Blood Culture    SPUTUM, BACTERIAL CULTURE WITH GRAM STAIN    Legionella Urine Antigen    Streptococcus Pneumoniae Antigen    SPUTUM, BACTERIAL CULTURE WITH GRAM STAIN    Legionella Urine Antigen    Staphylococcus aureus Methicillin Resistant (MRSA) PCR    Regular Diet    NPO Diet with Exceptions; Medications    No Isolation    Informed consent obtained    MED IP Monitored Bed Orders Telemetry order set (Epic #[id number])    Vital Signs    Pulse  Oximetry    Bladder scan    Urinary Retention Protocol    Notify: per Routine Standards    Activity    IF SYMPTOMATIC HYPOTENSION (SBP less than 90 mmHg, unrelated to cardiac arrhythmia)    ACLS Guidelines    No VTE/DVT Pharmacologic Prophylaxis Needed    Intermittent Pneumatic Sequential Compression Device (SCDs)    Intake and output    Weigh    Informed consent obtained    Informed consent already obtained    Bladder scan    Insert/Maintain urethral catheter with indication    Discontinue indwelling urinary catheter: Per urinary catheter management and removal protocol    Bladder scan    Insert/Maintain urethral catheter with indication    Discontinue indwelling urinary catheter: Per urinary catheter management and removal protocol    Vital Signs (Specify)    Blood pressure (sepsis)    Weigh    Continuous Pulse Oximetry    Give first dose of antibiotics STAT    Antibiotics already ordered    Patient does not have hypotension, Lactic Acid greater than 4, or documented Septic Shock - 30 mL/kg IV Fluids not needed.    Informed consent already obtained    One Time Diet Liquid Clear    Case request operating room: EGD (ESOPHAGOGASTRODUODENOSCOPY)    Metered blood glucose    GLUCOSE, BEDSIDE - POINT OF CARE    pantoprazole (PROTONIX INJECT) injection 40 mg    sodium chloride 0.9% infusion    MILK THISTLE PO    DISCONTD: metOLazone (ZAROXOLYN) 2.5 MG tablet    DISCONTD: apixaBAN (ELIQUIS) 2.5 MG Tab    metoPROLOL succinate (TOPROL-XL) 50 MG 24 hr tablet    DISCONTD: sacubitril-valsartan (ENTRESTO) 49-51 MG per tablet    sodium chloride 0.9 % flush bag 25 mL    sodium chloride 0.9 % injection 2 mL    sodium chloride (NORMAL SALINE) 0.9 % bolus 500 mL    OR Linked Order Group     acetaminophen (TYLENOL) tablet 650 mg     acetaminophen (TYLENOL) suppository 650 mg    OR Linked Order Group     ondansetron (ZOFRAN ODT) disintegrating tablet 4 mg     ondansetron (ZOFRAN) injection 4 mg    polyethylene glycol (MIRALAX) packet  17 g    Potassium Standard Replacement Protocol (Levels 3.5 and lower)    Magnesium Standard Replacement Protocol    Phosphorus Standard Replacement Protocol    DISCONTD: pantoprazole (PROTONIX INJECT) injection 40 mg    pantoprazole (PROTONIX) 80 mg/100mL in sodium chloride 0.9% infusion    NON FORMULARY    DISCONTD: bumetanide (BUMEX) tablet 1 mg    buPROPion XL (WELLBUTRIN XL) tablet 150 mg    DISCONTD: vitamin D3 capsule    dilTIAZem (TIAZAC,CARDIZEM CD) 24 hr capsule 120 mg    finasteride (PROSCAR) tablet 5 mg    hydrALAZINE (APRESOLINE) tablet 100 mg    levothyroxine (SYNTHROID, LEVOTHROID) tablet 100 mcg    melatonin tablet 3 mg    metoPROLOL succinate (TOPROL-XL) ER tablet 25 mg    rOPINIRole (REQUIP) tablet 3 mg    sacubitril-valsartan (ENTRESTO) 49-51 MG per tablet 1 tablet    thiamine (VITAMIN B1) tablet 100 mg    sodium chloride 0.9% infusion    cholecalciferol (VITAMIN D) tablet 125 mcg    iron sucrose (VENOFER) injection 200 mg    iron sucrose (VENOFER) injection 200 mg    DISCONTD: bumetanide (BUMEX) injection 1 mg    DISCONTD: lidocaine 1 % injection 5-10 mg    DISCONTD: insulin regular (human) (HumuLIN R, NovoLIN R) sliding scale injection    DISCONTD: lactated ringers infusion    sodium chloride 0.9% infusion    DISCONTD: bumetanide (BUMEX) tablet 1 mg    ipratropium-albuterol (DUONEB) 0.5-2.5 (3) MG/3ML nebulizer solution 3 mL    bumetanide (BUMEX) injection 2 mg    perflutren lipid microsphere (DEFINITY) injection 2 mL    metoPROLOL (LOPRESSOR) injection 5 mg    DISCONTD: VANCOMYCIN - PHARMACIST MONITORED Misc    cefTAZidime (FORTAZ) 1,000 mg in sodium chloride 0.9 % 100 mL IVPB    furosemide (LASIX INJECT) injection 40 mg    sodium chloride 0.9 % injector flush 100 mL    iohexol (OMNIPAQUE 300) contrast solution 100 mL    DISCONTD: metroNIDAZOLE (FLAGYL) premix IVPB 500 mg    metroNIDAZOLE (FLAGYL) premix IVPB 500 mg    vancomycin 2,000 mg in sodium chloride 0.9% 500 mL IVPB    DISCONTD:  vancomycin (VANCOCIN) 1,000 mg in sodium chloride 0.9 % 250 mL IVPB    bumetanide (BUMEX) injection 1 mg    potassium CHLORIDE (KLOR-CON M) jos ER tablet 20 mEq    guaiFENesin (MUCINEX) ER tablet 1,200 mg    sodium chloride 0.9% infusion    potassium CHLORIDE (KLOR-CON M) jos ER tablet 20 mEq    metOLazone (ZAROXOLYN) tablet 2.5 mg    DISCONTD: potassium CHLORIDE (KLOR-CON M) jos ER tablet 40 mEq    potassium CHLORIDE (KLOR-CON M) jos ER tablet 40 mEq    Telemetry monitoring          MEDICATIONS   Current Facility-Administered Medications   Medication Dose Route Frequency Provider Last Rate Last Admin    bumetanide (BUMEX) injection 1 mg  1 mg Intravenous BID Misha More MD   1 mg at 01/26/24 1718    guaiFENesin (MUCINEX) ER tablet 1,200 mg  1,200 mg Oral 2 times per day Mei Rivas APNP   1,200 mg at 01/26/24 2109    sodium chloride 0.9% infusion   Intravenous Continuous PRN Dalila Rosales MD        potassium CHLORIDE (KLOR-CON M) jos ER tablet 20 mEq  20 mEq Oral Daily with breakfast Bello Sargent MD   20 mEq at 01/26/24 1426    metOLazone (ZAROXOLYN) tablet 2.5 mg  2.5 mg Oral Once per day on Mon Wed Fri Bello Sargent MD   2.5 mg at 01/26/24 1719    ipratropium-albuterol (DUONEB) 0.5-2.5 (3) MG/3ML nebulizer solution 3 mL  3 mL Nebulization Q8H Resp Misha More MD   3 mL at 01/26/24 2305    metoPROLOL (LOPRESSOR) injection 5 mg  5 mg Intravenous Once Misha More MD        cefTAZidime (FORTAZ) 1,000 mg in sodium chloride 0.9 % 100 mL IVPB  1,000 mg Intravenous 3 times per day Misha More MD   Completed at 01/27/24 0657    metroNIDAZOLE (FLAGYL) premix IVPB 500 mg  500 mg Intravenous 3 times per day Misha More MD   Completed at 01/27/24 0657    iron sucrose (VENOFER) injection 200 mg  200 mg Intravenous Daily Christal Medrano APNP   200 mg at 01/26/24 0947    sodium chloride 0.9% infusion   Intravenous Continuous PRN Rodolfo Ramirez MD        sodium chloride  0.9 % flush bag 25 mL  25 mL Intravenous PRN Rodolfo Ramirez MD        sodium chloride 0.9 % injection 2 mL  2 mL Intracatheter 2 times per day Rodolfo Ramirez MD   2 mL at 01/26/24 2113    sodium chloride (NORMAL SALINE) 0.9 % bolus 500 mL  500 mL Intravenous PRN Rodolfo Ramirez MD        acetaminophen (TYLENOL) tablet 650 mg  650 mg Oral Q4H PRN Rodolfo Ramirez MD   650 mg at 01/25/24 0647    Or    acetaminophen (TYLENOL) suppository 650 mg  650 mg Rectal Q4H PRN Rodolfo Ramirez MD   650 mg at 01/25/24 2210    ondansetron (ZOFRAN ODT) disintegrating tablet 4 mg  4 mg Oral Q12H PRN Rodolfo Ramirez MD        Or    ondansetron (ZOFRAN) injection 4 mg  4 mg Intravenous Q12H PRN Rodolfo Ramirez MD   4 mg at 01/25/24 2336    polyethylene glycol (MIRALAX) packet 17 g  17 g Oral Daily PRN Rodolfo Ramirez MD        Potassium Standard Replacement Protocol (Levels 3.5 and lower)   Does not apply See Admin Instructions Rodolfo Ramirez MD        Magnesium Standard Replacement Protocol   Does not apply See Admin Instructions Rodolfo Ramirez MD        Phosphorus Standard Replacement Protocol   Does not apply See Admin Instructions Rodolfo Ramirez MD        pantoprazole (PROTONIX) 80 mg/100mL in sodium chloride 0.9% infusion  8 mg/hr Intravenous Continuous Rodolfo Ramirez MD 10 mL/hr at 01/27/24 0326 8 mg/hr at 01/27/24 0326    buPROPion XL (WELLBUTRIN XL) tablet 150 mg  150 mg Oral Daily John Loredo MD   150 mg at 01/26/24 0946    dilTIAZem (TIAZAC,CARDIZEM CD) 24 hr capsule 120 mg  120 mg Oral Daily Jonh Loredo MD   120 mg at 01/26/24 0946    finasteride (PROSCAR) tablet 5 mg  5 mg Oral Daily John Loredo MD   5 mg at 01/26/24 0946    hydrALAZINE (APRESOLINE) tablet 100 mg  100 mg Oral BID John Loredo MD   100 mg at 01/26/24 2108    levothyroxine (SYNTHROID, LEVOTHROID) tablet 100 mcg  100 mcg Oral QAM AC John Loredo MD   100 mcg at 01/27/24 0509    melatonin tablet 3 mg  3 mg Oral Nightly PRN John Loredo MD         metoPROLOL succinate (TOPROL-XL) ER tablet 25 mg  25 mg Oral BID John Loredo MD   25 mg at 24    rOPINIRole (REQUIP) tablet 3 mg  3 mg Oral Nightly John Loredo MD   3 mg at 24    [Held by provider] sacubitril-valsartan (ENTRESTO) 49-51 MG per tablet 1 tablet  1 tablet Oral BID John Loredo MD        thiamine (VITAMIN B1) tablet 100 mg  100 mg Oral Daily John Loredo MD   100 mg at 24 09    sodium chloride 0.9% infusion   Intravenous Continuous PRN John Loredo MD        cholecalciferol (VITAMIN D) tablet 125 mcg  125 mcg Oral Daily John Loredo MD   125 mcg at 24 0946       MEDICAL HISTORY    Past Medical History:   Diagnosis Date    Aortic stenosis     Atherosclerosis of native coronary artery of native heart     Atrial fibrillation (CMD)     Cervical spondylosis without myelopathy     CHF (congestive heart failure) (CMD)     Fatty liver     Gastroesophageal reflux disease     Gastroparesis     Gout     Hyperlipidemia     Hypertension     Hypertrophy of prostate with urinary retention     NICOLAS on CPAP     uses cpap    Other specified hypothyroidism     hypo    Pneumonia     Wears dentures     Wears glasses         SURGICAL HISTORY   Past Surgical History:   Procedure Laterality Date    APPENDECTOMY      CARDIAC CATHERIZATION      CORONARY ARTERY BYPASS GRAFT      5 vessel    EGD  2021    with fna    LIPOMA RESECTION      TAVR ILIOFEMORAL-CV  2021    TONSILECTOMY, ADENOIDECTOMY, BILATERAL MYRINGOTOMY AND TUBES  1964        SOCIAL HISTORY   Social History     Tobacco Use    Smoking status: Former     Current packs/day: 0.00     Average packs/day: 1 pack/day for 40.0 years (40.0 ttl pk-yrs)     Types: Cigars, Cigarettes     Start date:      Quit date: 2019     Years since quittin.0    Smokeless tobacco: Never   Vaping Use    Vaping Use: never used   Substance Use Topics    Alcohol use: Yes     Comment: one drink daily    Drug use:  Never         FAMILY HISTORY   Family History   Problem Relation Age of Onset    Kidney disease Mother     Coronary Artery Disease Mother     Myocardial Infarction Father     Patient is unaware of any medical problems Sister     Patient is unaware of any medical problems Sister     Kidney disease Sister     Patient is unaware of any medical problems Sister     Diabetes Neg Hx     Cancer, Colon Neg Hx     Cancer, Prostate Neg Hx         REVIEW OF SYSTEMS      General / Constitutional: denies fever     Respiratory:  Shortness of breath  Cardiovascular: denies chest pain, pressure, PND, orthopnea ,syncope, presyncope, positive for edema, claudication    Gastrointestinal: denies hematochezia or significant change in bowel habits   Musculoskeletal:  denies gait change or weakness    Neurological:  denies recent stroke or TIA    Psychiatric:  oriented to person, place, and time, affect appropriate, no difficulties with speech or language      PHYSICAL EXAMINATION      Visit Vitals  BP (!) 158/69 (BP Location: RUE - Right upper extremity, Patient Position: Supine)   Pulse 93   Temp 98.4 °F (36.9 °C) (Oral)   Resp 18   Ht 5' 10\" (1.778 m)   Wt 100.5 kg (221 lb 9 oz)   SpO2 90%   BMI 31.79 kg/m²     CONSTITUTIONAL: Well developed, well nourished. No distress. 3 Vital Signs as noted.   RESPIRATORY:  Diminished breath sounds   CARDIOVASCULAR: PMI Non displaced.   Sl, S2 normal. No murmur, rub or gallop.  Irregular rate and rhythm No carotid bruit.   No abnormal abdominal palpable aortic mass noted. No bruit.   No significant peripheral edema or varicosities.   GASTROINTESTINAL: Soft, non-tender, no masses, no HSM   NEUROLOGICAL: No motor deficits, CN intact.   MUSCULO-SKELETAL AND EXTREMITIES: No cyanosis or clubbing of Digits/Nails. Normal gait. No significant Kyphosis or Scoliosis.   PSYCHIATRIC: Alert and Oriented to time, place and person. Normal mood and affect.       LABS      Lab Services on 01/17/2024   Component Date  Value Ref Range Status    NT-proBNP 01/17/2024 1,667 (H)  <=450 pg/mL Final    Sodium 01/17/2024 131 (L)  135 - 145 mmol/L Final    Potassium 01/17/2024 4.6  3.4 - 5.1 mmol/L Final    Chloride 01/17/2024 95 (L)  97 - 110 mmol/L Final    Carbon Dioxide 01/17/2024 26  21 - 32 mmol/L Final    Anion Gap 01/17/2024 15  7 - 19 mmol/L Final    Glucose 01/17/2024 85  70 - 99 mg/dL Final    BUN 01/17/2024 26 (H)  6 - 20 mg/dL Final    Creatinine 01/17/2024 1.12  0.67 - 1.17 mg/dL Final    Glomerular Filtration Rate 01/17/2024 66  >=60 Final    eGFR results = or >60 mL/min/1.73m2 = Normal kidney function. Estimated GFR calculated using the CKD-EPI-R (2021) equation that does not include race in the creatinine calculation.    BUN/Cr 01/17/2024 23  7 - 25 Final    Calcium 01/17/2024 8.9  8.4 - 10.2 mg/dL Final    Bilirubin, Total 01/17/2024 0.3  0.2 - 1.0 mg/dL Final    GOT/AST 01/17/2024 43 (H)  <=37 Units/L Final    GPT/ALT 01/17/2024 31  <64 Units/L Final    Alkaline Phosphatase 01/17/2024 135 (H)  45 - 117 Units/L Final    Albumin 01/17/2024 2.5 (L)  3.6 - 5.1 g/dL Final    Protein, Total 01/17/2024 7.6  6.4 - 8.2 g/dL Final    Globulin 01/17/2024 5.1 (H)  2.0 - 4.0 g/dL Final    A/G Ratio 01/17/2024 0.5 (L)  1.0 - 2.4 Final    Phosphorus 01/17/2024 4.3  2.4 - 4.7 mg/dL Final       EKG    Controlled atrial fibrillation      ECHO      Results for orders placed or performed in visit on 07/26/22   Echo M-Mode/2D/Doppler (Routine)   Result Value Ref Range    Interventricular Septum in End Diastole 1.347 cm    Left Ventricular Internal Dimension in Diastole 5.322 cm    LV end diastolic posterior wall thickness 2D 1.338 cm    Left Internal Dimenson in Systole 4.141 cm    LV outflow tract 2.141 cm    Ascending Aorta 2.995 cm    Tricuspid Valve Peak Regurgitation Velocity 4.1 m/s    LVOT VTI 23.888 cm    MV Peak E Velocity 1.1 m/s    MV Peak A Velocity 0.4 m/s    MV E Tissue Berry Med 0.0 m/s    DOP Calc LVOT Peak Berry 1.2 m/s    AV  Peak Velocity 2.0 m/s    Tricuspid valve annular peak velocity 0.0 m/s    TV Estimated Right Arterial Pressure 8 mmHg    MV E Wave Berry/E Tissue Berry Med 13.277 unitless    Right Ventricular Area Change (APICAL 4 Chamber View) 25.494 %    Aortic Valve Area 2.209 cmÂ²    Est Right Vent Systolic Pressure by Tricuspid Regurgitation Jet 77.031 mmHg    Ejection Fraction 52 %    AV Mean Gradient 7.174 mmHg    AV Peak Gradient 16.724 mmHg    PATRICK LVOT Peak Gradient 2.742 mmHg    LV End Systolic Longitudinal Strain Global -9 %    RV End Systolic Longitudinal Strain Free Wall -16 %

## 2025-06-05 DIAGNOSIS — I48.11 LONGSTANDING PERSISTENT ATRIAL FIBRILLATION (HCC): ICD-10-CM

## 2025-06-05 DIAGNOSIS — I10 PRIMARY HYPERTENSION: ICD-10-CM

## 2025-06-05 RX ORDER — METOPROLOL TARTRATE 25 MG/1
TABLET, FILM COATED ORAL
Qty: 45 TABLET | Refills: 2 | Status: SHIPPED | OUTPATIENT
Start: 2025-06-05

## 2025-06-05 NOTE — TELEPHONE ENCOUNTER
This medication refill is regarding a electronic request.  Refill requested by  pharmacy .    Requested Prescriptions     Pending Prescriptions Disp Refills    metoprolol tartrate (LOPRESSOR) 25 MG tablet [Pharmacy Med Name: METOPROLOL TARTRATE 25MG TABLET] 45 tablet 2     Sig: TAKE (1/2) TABLET, TWO TIMES A DAY, BY MOUTH.       Date of last visit: 10/10/2024  Date of next visit: 9/4/2025  Date of last refill:   Pharmacy Name: [facility]

## 2025-06-12 ENCOUNTER — HOSPITAL ENCOUNTER (OUTPATIENT)
Age: 70
Discharge: HOME | End: 2025-06-12
Payer: MEDICARE

## 2025-06-12 DIAGNOSIS — I10 PRIMARY HYPERTENSION: ICD-10-CM

## 2025-06-12 DIAGNOSIS — I48.11 LONGSTANDING PERSISTENT ATRIAL FIBRILLATION (HCC): ICD-10-CM

## 2025-06-12 DIAGNOSIS — Z12.5 ENCOUNTER FOR SCREENING FOR MALIGNANT NEOPLASM OF PROSTATE: ICD-10-CM

## 2025-06-12 DIAGNOSIS — N40.0 PROSTATE ENLARGEMENT: ICD-10-CM

## 2025-06-12 LAB
ALBUMIN SERPL BCG-MCNC: 4.2 G/DL (ref 3.4–4.9)
ALP SERPL-CCNC: 86 U/L (ref 40–129)
ALT SERPL W/O P-5'-P-CCNC: 24 U/L (ref 10–50)
ANION GAP SERPL CALC-SCNC: 10 MEQ/L (ref 8–16)
AST SERPL-CCNC: 26 U/L (ref 10–50)
BASOPHILS ABSOLUTE: 0 THOU/MM3 (ref 0–0.1)
BASOPHILS NFR BLD AUTO: 0.7 %
BILIRUB SERPL-MCNC: 0.5 MG/DL (ref 0.3–1.2)
BUN SERPL-MCNC: 16 MG/DL (ref 8–23)
CALCIUM SERPL-MCNC: 10.1 MG/DL (ref 8.8–10.2)
CHLORIDE SERPL-SCNC: 104 MEQ/L (ref 98–111)
CHOLEST SERPL-MCNC: 189 MG/DL (ref 100–199)
CO2 SERPL-SCNC: 24 MEQ/L (ref 22–29)
CREAT SERPL-MCNC: 0.9 MG/DL (ref 0.7–1.2)
DEPRECATED RDW RBC AUTO: 45.7 FL (ref 35–45)
EOSINOPHIL NFR BLD AUTO: 3.5 %
EOSINOPHILS ABSOLUTE: 0.2 THOU/MM3 (ref 0–0.4)
ERYTHROCYTE [DISTWIDTH] IN BLOOD BY AUTOMATED COUNT: 13.8 % (ref 11.5–14.5)
GFR SERPL CREATININE-BSD FRML MDRD: > 90 ML/MIN/1.73M2
GLUCOSE SERPL-MCNC: 104 MG/DL (ref 74–109)
HCT VFR BLD AUTO: 46 % (ref 42–52)
HDLC SERPL-MCNC: 38 MG/DL
HGB BLD-MCNC: 15.4 GM/DL (ref 14–18)
IMM GRANULOCYTES # BLD AUTO: 0.01 THOU/MM3 (ref 0–0.07)
IMM GRANULOCYTES NFR BLD AUTO: 0.2 %
LDLC SERPL CALC-MCNC: 128 MG/DL
LYMPHOCYTES ABSOLUTE: 3.2 THOU/MM3 (ref 1–4.8)
LYMPHOCYTES NFR BLD AUTO: 51.7 %
MCH RBC QN AUTO: 30.4 PG (ref 26–33)
MCHC RBC AUTO-ENTMCNC: 33.5 GM/DL (ref 32.2–35.5)
MCV RBC AUTO: 90.9 FL (ref 80–94)
MONOCYTES ABSOLUTE: 0.5 THOU/MM3 (ref 0.4–1.3)
MONOCYTES NFR BLD AUTO: 8.1 %
NEUTROPHILS ABSOLUTE: 2.2 THOU/MM3 (ref 1.8–7.7)
NEUTROPHILS NFR BLD AUTO: 35.8 %
NRBC BLD AUTO-RTO: 0 /100 WBC
PLATELET # BLD AUTO: 256 THOU/MM3 (ref 130–400)
PMV BLD AUTO: 10.2 FL (ref 9.4–12.4)
POTASSIUM SERPL-SCNC: 4.4 MEQ/L (ref 3.5–5.2)
PROT SERPL-MCNC: 7.3 G/DL (ref 6.4–8.3)
PSA SERPL-MCNC: 0.9 NG/ML (ref 0–1)
RBC # BLD AUTO: 5.06 MILL/MM3 (ref 4.7–6.1)
SODIUM SERPL-SCNC: 138 MEQ/L (ref 135–145)
TRIGL SERPL-MCNC: 115 MG/DL (ref 0–199)
TSH SERPL DL<=0.05 MIU/L-ACNC: 1.98 UIU/ML (ref 0.27–4.2)
WBC # BLD AUTO: 6.1 THOU/MM3 (ref 4.8–10.8)

## 2025-06-12 PROCEDURE — 80053 COMPREHEN METABOLIC PANEL: CPT

## 2025-06-12 PROCEDURE — 84443 ASSAY THYROID STIM HORMONE: CPT

## 2025-06-12 PROCEDURE — 36415 COLL VENOUS BLD VENIPUNCTURE: CPT

## 2025-06-12 PROCEDURE — 84153 ASSAY OF PSA TOTAL: CPT

## 2025-06-12 PROCEDURE — 80061 LIPID PANEL: CPT

## 2025-06-12 PROCEDURE — 85025 COMPLETE CBC W/AUTO DIFF WBC: CPT

## 2025-06-13 ENCOUNTER — RESULTS FOLLOW-UP (OUTPATIENT)
Dept: FAMILY MEDICINE CLINIC | Age: 70
End: 2025-06-13

## 2025-06-16 ENCOUNTER — RESULTS FOLLOW-UP (OUTPATIENT)
Dept: UROLOGY | Age: 70
End: 2025-06-16

## 2025-06-19 ENCOUNTER — OFFICE VISIT (OUTPATIENT)
Dept: UROLOGY | Age: 70
End: 2025-06-19
Payer: MEDICARE

## 2025-06-19 VITALS — HEIGHT: 72 IN | WEIGHT: 210 LBS | RESPIRATION RATE: 18 BRPM | BODY MASS INDEX: 28.44 KG/M2

## 2025-06-19 DIAGNOSIS — Z12.5 ENCOUNTER FOR SCREENING PROSTATE SPECIFIC ANTIGEN (PSA) MEASUREMENT: Primary | ICD-10-CM

## 2025-06-19 PROCEDURE — 1159F MED LIST DOCD IN RCRD: CPT

## 2025-06-19 PROCEDURE — 1036F TOBACCO NON-USER: CPT

## 2025-06-19 PROCEDURE — 3017F COLORECTAL CA SCREEN DOC REV: CPT

## 2025-06-19 PROCEDURE — 99214 OFFICE O/P EST MOD 30 MIN: CPT

## 2025-06-19 PROCEDURE — 1123F ACP DISCUSS/DSCN MKR DOCD: CPT

## 2025-06-19 PROCEDURE — G8417 CALC BMI ABV UP PARAM F/U: HCPCS

## 2025-06-19 PROCEDURE — G8427 DOCREV CUR MEDS BY ELIG CLIN: HCPCS

## 2025-06-19 RX ORDER — FINASTERIDE 5 MG/1
5 TABLET, FILM COATED ORAL DAILY
Qty: 90 TABLET | Refills: 3 | Status: SHIPPED | OUTPATIENT
Start: 2025-06-19

## 2025-06-19 NOTE — PROGRESS NOTES
[facility] [place] [facility]  [facility]OLOGY  [address]  [facility] [address]  Dept: [phone]  Loc: [phone]    Visit Date: 6/19/2025        HPI:     HPI  History of Present Illness  Pt is a 70-year-old male that presents in follow-up.     Hx of Gross hematuria.  He subsequently had a CT urogram on August 8, 2023.  Notable for a 15 mm nonenhancing simple cyst in the midpole of the right kidney which contains a 3 mm peripheral calcification.  The prostate gland was noted to be enlarged and exerting mass effect on the posterior wall of the urinary bladder. Cystoscopy performed on August 15, 2023 with Dr. Horowitz.  Patient with a hypervascular prostate. This is likely the source of bleeding. Finasteride started. Patient otherwise denies hx of smoking.     He is currently on finasteride, which he reports has been causing retrograde ejaculation, leading to feelings of depression. He expresses concern about the potential risk of prostate cancer associated with an enlarged prostate. He has a history of vasectomy. His father had erectile dysfunction issues.      He has discontinued the use of tadalafil.        Current Outpatient Medications   Medication Sig Dispense Refill    metoprolol tartrate (LOPRESSOR) 25 MG tablet TAKE (1/2) TABLET, TWO TIMES A DAY, BY MOUTH. 45 tablet 2    apixaban (ELIQUIS) 5 MG TABS tablet TAKE ONE TABLET, TWO TIMES A DAY, BY MOUTH. 180 tablet 0    lisinopril (PRINIVIL;ZESTRIL) 10 MG tablet TAKE ONE TABLET DAILY, BY MOUTH. 90 tablet 1    finasteride (PROSCAR) 5 MG tablet TAKE ONE TABLET DAILY, BY MOUTH. 90 tablet 0    tadalafil (CIALIS) 20 MG tablet Take 1 tablet by mouth daily as needed for Erectile Dysfunction 90 tablet 0    acetaminophen 650 MG TABS Take 650 mg by mouth every 6 hours.       No current facility-administered medications for this visit.       Past Medical History  Jaren  has a past medical history of Hypertension.    Past Surgical

## 2025-06-19 NOTE — PATIENT INSTRUCTIONS
Continue Finasteride 5 mg daily  Follow-up in 1 year with a PSA prior  Call with questions, comments, or concerns. I recommend going to the ED for further evaluation if you develop fever, chills, nausea, vomiting, chest pain, SOB, or calf pain.    The medication list included in this document is our record of what you are currently taking, including any changes that were made at today's visit.  If you find any differences when compared to your medications at home, or have any questions that were not answered at your visit, please contact the office.

## 2025-08-22 DIAGNOSIS — I10 PRIMARY HYPERTENSION: ICD-10-CM

## 2025-08-22 RX ORDER — LISINOPRIL 10 MG/1
10 TABLET ORAL DAILY
Qty: 90 TABLET | Refills: 1 | Status: SHIPPED | OUTPATIENT
Start: 2025-08-22

## 2025-09-05 DIAGNOSIS — I10 PRIMARY HYPERTENSION: ICD-10-CM

## 2025-09-05 DIAGNOSIS — I48.11 LONGSTANDING PERSISTENT ATRIAL FIBRILLATION (HCC): ICD-10-CM

## 2025-09-05 RX ORDER — METOPROLOL TARTRATE 25 MG/1
TABLET, FILM COATED ORAL
Qty: 45 TABLET | Refills: 2 | Status: SHIPPED | OUTPATIENT
Start: 2025-09-05